# Patient Record
Sex: MALE
[De-identification: names, ages, dates, MRNs, and addresses within clinical notes are randomized per-mention and may not be internally consistent; named-entity substitution may affect disease eponyms.]

---

## 2020-01-02 PROBLEM — Z00.00 ENCOUNTER FOR PREVENTIVE HEALTH EXAMINATION: Status: ACTIVE | Noted: 2020-01-02

## 2020-01-13 ENCOUNTER — APPOINTMENT (OUTPATIENT)
Dept: ORTHOPEDIC SURGERY | Facility: CLINIC | Age: 70
End: 2020-01-13
Payer: COMMERCIAL

## 2020-01-13 VITALS
HEIGHT: 70 IN | DIASTOLIC BLOOD PRESSURE: 74 MMHG | WEIGHT: 224 LBS | HEART RATE: 111 BPM | SYSTOLIC BLOOD PRESSURE: 119 MMHG | BODY MASS INDEX: 32.07 KG/M2

## 2020-01-13 DIAGNOSIS — M16.11 UNILATERAL PRIMARY OSTEOARTHRITIS, RIGHT HIP: ICD-10-CM

## 2020-01-13 PROCEDURE — 99204 OFFICE O/P NEW MOD 45 MIN: CPT

## 2020-01-13 RX ORDER — MELOXICAM 15 MG/1
15 TABLET ORAL
Qty: 30 | Refills: 1 | Status: ACTIVE | COMMUNITY
Start: 2020-01-13 | End: 1900-01-01

## 2020-01-13 NOTE — DISCUSSION/SUMMARY
[de-identified] : The patient is gone over the situation shown his x-rays. He understands she has arthritis of the right hip and was prescribed Mobic and physical therapy. He will return to the office in 6-8 weeks If symptoms persist or worsen, and further workup or treatment recommendations will be determined at that point

## 2020-01-13 NOTE — HISTORY OF PRESENT ILLNESS
[Worsening] : worsening [___ yrs] : [unfilled] year(s) ago [9] : a maximum pain level of 9/10 [Standing] : standing [Hip Movement] : worsened by hip movement [Intermit.] : ~He/She~ states the symptoms seem to be intermittent [Walking] : worsened by walking [None] : No relieving factors are noted [de-identified] : Pt presents for initial evaluation with pain in his right hip.  Pt states  he had mineral water treatments 2007 and he felt better. Pt has xray from [facility] ([place]) 12/16/19 of his right hip. He is taking nothing for pain.

## 2020-01-13 NOTE — ASSESSMENT
[FreeTextEntry1] : The patient is informed of his findings and his x-rays were discussed. He will be referred to physical therapy and will take Mobic as needed. If symptoms persist or worsen patient will be reevaluated in 6-8 weeks to check his

## 2020-01-13 NOTE — PHYSICAL EXAM
[de-identified] : Physical examination the right hip discloses mild restricted motion to extremes of rotation and abduction. There is positive right groin tenderness on passive motion of the hip. [de-identified] : Review of x-rays Of the right hip and pelvis discloses some mild to moderate roll acetabular joint space narrowing with significant subchondral cam lesion with periarticular osteophytes

## 2020-10-09 ENCOUNTER — APPOINTMENT (OUTPATIENT)
Dept: ORTHOPEDIC SURGERY | Facility: CLINIC | Age: 70
End: 2020-10-09

## 2020-10-19 ENCOUNTER — APPOINTMENT (OUTPATIENT)
Dept: ORTHOPEDIC SURGERY | Facility: CLINIC | Age: 70
End: 2020-10-19

## 2021-04-13 ENCOUNTER — APPOINTMENT (OUTPATIENT)
Dept: ORTHOPEDIC SURGERY | Facility: CLINIC | Age: 71
End: 2021-04-13